# Patient Record
Sex: FEMALE | Race: WHITE | NOT HISPANIC OR LATINO | Employment: FULL TIME | URBAN - METROPOLITAN AREA
[De-identification: names, ages, dates, MRNs, and addresses within clinical notes are randomized per-mention and may not be internally consistent; named-entity substitution may affect disease eponyms.]

---

## 2020-11-10 DIAGNOSIS — Z11.59 ENCOUNTER FOR SCREENING FOR OTHER VIRAL DISEASES: Primary | ICD-10-CM

## 2020-11-25 RX ORDER — ATORVASTATIN CALCIUM 40 MG/1
40 TABLET, FILM COATED ORAL DAILY
COMMUNITY

## 2020-11-25 RX ORDER — IBUPROFEN 600 MG/1
600 TABLET, FILM COATED ORAL EVERY 6 HOURS PRN
Status: ON HOLD | COMMUNITY
End: 2020-12-10

## 2020-11-25 RX ORDER — ASPIRIN 81 MG/1
81 TABLET ORAL DAILY
Status: ON HOLD | COMMUNITY
End: 2020-12-10

## 2020-11-25 RX ORDER — PHENOL 1.4 %
1 AEROSOL, SPRAY (ML) MUCOUS MEMBRANE 2 TIMES DAILY WITH MEALS
COMMUNITY

## 2020-11-25 RX ORDER — LEVOTHYROXINE SODIUM 50 UG/1
50 TABLET ORAL DAILY
COMMUNITY

## 2020-11-25 RX ORDER — ACETAMINOPHEN 500 MG
500-1000 TABLET ORAL EVERY 6 HOURS PRN
Status: ON HOLD | COMMUNITY
End: 2020-12-10

## 2020-11-27 DIAGNOSIS — Z11.59 ENCOUNTER FOR SCREENING FOR OTHER VIRAL DISEASES: ICD-10-CM

## 2020-11-27 PROCEDURE — U0003 INFECTIOUS AGENT DETECTION BY NUCLEIC ACID (DNA OR RNA); SEVERE ACUTE RESPIRATORY SYNDROME CORONAVIRUS 2 (SARS-COV-2) (CORONAVIRUS DISEASE [COVID-19]), AMPLIFIED PROBE TECHNIQUE, MAKING USE OF HIGH THROUGHPUT TECHNOLOGIES AS DESCRIBED BY CMS-2020-01-R: HCPCS | Performed by: ORTHOPAEDIC SURGERY

## 2020-11-27 ASSESSMENT — MIFFLIN-ST. JEOR: SCORE: 1782.56

## 2020-11-28 LAB
SARS-COV-2 RNA SPEC QL NAA+PROBE: NOT DETECTED
SPECIMEN SOURCE: NORMAL

## 2020-12-02 DIAGNOSIS — Z11.59 ENCOUNTER FOR SCREENING FOR OTHER VIRAL DISEASES: Primary | ICD-10-CM

## 2020-12-03 ENCOUNTER — ANESTHESIA EVENT (OUTPATIENT)
Dept: SURGERY | Facility: CLINIC | Age: 50
End: 2020-12-03
Payer: COMMERCIAL

## 2020-12-07 DIAGNOSIS — Z11.59 ENCOUNTER FOR SCREENING FOR OTHER VIRAL DISEASES: ICD-10-CM

## 2020-12-07 PROCEDURE — U0003 INFECTIOUS AGENT DETECTION BY NUCLEIC ACID (DNA OR RNA); SEVERE ACUTE RESPIRATORY SYNDROME CORONAVIRUS 2 (SARS-COV-2) (CORONAVIRUS DISEASE [COVID-19]), AMPLIFIED PROBE TECHNIQUE, MAKING USE OF HIGH THROUGHPUT TECHNOLOGIES AS DESCRIBED BY CMS-2020-01-R: HCPCS | Performed by: ORTHOPAEDIC SURGERY

## 2020-12-08 LAB
LABORATORY COMMENT REPORT: NORMAL
SARS-COV-2 RNA SPEC QL NAA+PROBE: NEGATIVE
SARS-COV-2 RNA SPEC QL NAA+PROBE: NORMAL
SPECIMEN SOURCE: NORMAL
SPECIMEN SOURCE: NORMAL

## 2020-12-09 ENCOUNTER — APPOINTMENT (OUTPATIENT)
Dept: GENERAL RADIOLOGY | Facility: CLINIC | Age: 50
End: 2020-12-09
Attending: ORTHOPAEDIC SURGERY
Payer: COMMERCIAL

## 2020-12-09 ENCOUNTER — HOSPITAL ENCOUNTER (INPATIENT)
Facility: CLINIC | Age: 50
LOS: 1 days | Discharge: HOME | End: 2020-12-10
Attending: ORTHOPAEDIC SURGERY | Admitting: ORTHOPAEDIC SURGERY
Payer: COMMERCIAL

## 2020-12-09 ENCOUNTER — ANESTHESIA (OUTPATIENT)
Dept: SURGERY | Facility: CLINIC | Age: 50
End: 2020-12-09
Payer: COMMERCIAL

## 2020-12-09 DIAGNOSIS — Z98.1 S/P CERVICAL SPINAL FUSION: Primary | ICD-10-CM

## 2020-12-09 LAB
ABO + RH BLD: NORMAL
ABO + RH BLD: NORMAL
BLD GP AB SCN SERPL QL: NORMAL
BLOOD BANK CMNT PATIENT-IMP: NORMAL
GLUCOSE BLDC GLUCOMTR-MCNC: 179 MG/DL (ref 70–99)
GLUCOSE BLDC GLUCOMTR-MCNC: 188 MG/DL (ref 70–99)
GLUCOSE BLDC GLUCOMTR-MCNC: 85 MG/DL (ref 70–99)
SPECIMEN EXP DATE BLD: NORMAL

## 2020-12-09 PROCEDURE — 250N000009 HC RX 250: Performed by: NURSE ANESTHETIST, CERTIFIED REGISTERED

## 2020-12-09 PROCEDURE — 00NW0ZZ RELEASE CERVICAL SPINAL CORD, OPEN APPROACH: ICD-10-PCS | Performed by: ORTHOPAEDIC SURGERY

## 2020-12-09 PROCEDURE — 250N000013 HC RX MED GY IP 250 OP 250 PS 637: Performed by: PHYSICIAN ASSISTANT

## 2020-12-09 PROCEDURE — 99222 1ST HOSP IP/OBS MODERATE 55: CPT | Performed by: INTERNAL MEDICINE

## 2020-12-09 PROCEDURE — 999N000179 XR SURGERY CARM FLUORO LESS THAN 5 MIN W STILLS: Mod: TC

## 2020-12-09 PROCEDURE — 86850 RBC ANTIBODY SCREEN: CPT | Performed by: ANESTHESIOLOGY

## 2020-12-09 PROCEDURE — 250N000011 HC RX IP 250 OP 636: Performed by: NURSE ANESTHETIST, CERTIFIED REGISTERED

## 2020-12-09 PROCEDURE — C1762 CONN TISS, HUMAN(INC FASCIA): HCPCS | Performed by: ORTHOPAEDIC SURGERY

## 2020-12-09 PROCEDURE — 999N001017 HC STATISTIC GLUCOSE BY METER IP

## 2020-12-09 PROCEDURE — C1713 ANCHOR/SCREW BN/BN,TIS/BN: HCPCS | Performed by: ORTHOPAEDIC SURGERY

## 2020-12-09 PROCEDURE — 0RG20A0 FUSION OF 2 OR MORE CERVICAL VERTEBRAL JOINTS WITH INTERBODY FUSION DEVICE, ANTERIOR APPROACH, ANTERIOR COLUMN, OPEN APPROACH: ICD-10-PCS | Performed by: ORTHOPAEDIC SURGERY

## 2020-12-09 PROCEDURE — 0RT30ZZ RESECTION OF CERVICAL VERTEBRAL DISC, OPEN APPROACH: ICD-10-PCS | Performed by: ORTHOPAEDIC SURGERY

## 2020-12-09 PROCEDURE — 761N000001 HC RECOVERY PHASE 1 LEVEL 1 FIRST HR: Performed by: ORTHOPAEDIC SURGERY

## 2020-12-09 PROCEDURE — 258N000003 HC RX IP 258 OP 636: Performed by: NURSE ANESTHETIST, CERTIFIED REGISTERED

## 2020-12-09 PROCEDURE — 250N000011 HC RX IP 250 OP 636: Performed by: ANESTHESIOLOGY

## 2020-12-09 PROCEDURE — 370N000001 HC ANESTHESIA TECHNICAL FEE, 1ST 30 MIN: Performed by: ORTHOPAEDIC SURGERY

## 2020-12-09 PROCEDURE — 272N000001 HC OR GENERAL SUPPLY STERILE: Performed by: ORTHOPAEDIC SURGERY

## 2020-12-09 PROCEDURE — 370N000002 HC ANESTHESIA TECHNICAL FEE, EACH ADDTL 15 MIN: Performed by: ORTHOPAEDIC SURGERY

## 2020-12-09 PROCEDURE — 250N000013 HC RX MED GY IP 250 OP 250 PS 637: Performed by: ANESTHESIOLOGY

## 2020-12-09 PROCEDURE — 250N000013 HC RX MED GY IP 250 OP 250 PS 637: Performed by: INTERNAL MEDICINE

## 2020-12-09 PROCEDURE — 86900 BLOOD TYPING SEROLOGIC ABO: CPT | Performed by: ANESTHESIOLOGY

## 2020-12-09 PROCEDURE — 99207 PR CONSULT E&M CHANGED TO INITIAL LEVEL: CPT | Performed by: INTERNAL MEDICINE

## 2020-12-09 PROCEDURE — 86901 BLOOD TYPING SEROLOGIC RH(D): CPT | Performed by: ANESTHESIOLOGY

## 2020-12-09 PROCEDURE — 761N000002 HC RECOVERY PHASE 1 LEVEL 1 EA ADDTL HR: Performed by: ORTHOPAEDIC SURGERY

## 2020-12-09 PROCEDURE — 360N000036 HC SURGERY LEVEL 5 W FLUORO 1ST 30 MIN: Performed by: ORTHOPAEDIC SURGERY

## 2020-12-09 PROCEDURE — 250N000011 HC RX IP 250 OP 636: Performed by: PHYSICIAN ASSISTANT

## 2020-12-09 PROCEDURE — 250N000009 HC RX 250: Performed by: ORTHOPAEDIC SURGERY

## 2020-12-09 PROCEDURE — 120N000001 HC R&B MED SURG/OB

## 2020-12-09 PROCEDURE — 999N000136 HC STATISTIC PRE PROC ASSESS II: Performed by: ORTHOPAEDIC SURGERY

## 2020-12-09 PROCEDURE — 360N000033 HC SURGERY LEVEL 5 EA 15 ADDTL MIN: Performed by: ORTHOPAEDIC SURGERY

## 2020-12-09 PROCEDURE — 258N000003 HC RX IP 258 OP 636: Performed by: ANESTHESIOLOGY

## 2020-12-09 PROCEDURE — 258N000003 HC RX IP 258 OP 636: Performed by: PHYSICIAN ASSISTANT

## 2020-12-09 PROCEDURE — 8E09XBF COMPUTER ASSISTED PROCEDURE OF HEAD AND NECK REGION, WITH FLUOROSCOPY: ICD-10-PCS | Performed by: ORTHOPAEDIC SURGERY

## 2020-12-09 DEVICE — GRAFT BONE CRUSH CANC 15ML 400075: Type: IMPLANTABLE DEVICE | Site: SPINE CERVICAL | Status: FUNCTIONAL

## 2020-12-09 DEVICE — IMPLANTABLE DEVICE: Type: IMPLANTABLE DEVICE | Site: SPINE CERVICAL | Status: FUNCTIONAL

## 2020-12-09 RX ORDER — HYDRALAZINE HYDROCHLORIDE 20 MG/ML
2.5-5 INJECTION INTRAMUSCULAR; INTRAVENOUS EVERY 10 MIN PRN
Status: DISCONTINUED | OUTPATIENT
Start: 2020-12-09 | End: 2020-12-09 | Stop reason: HOSPADM

## 2020-12-09 RX ORDER — FENTANYL CITRATE-0.9 % NACL/PF 10 MCG/ML
PLASTIC BAG, INJECTION (ML) INTRAVENOUS CONTINUOUS PRN
Status: DISCONTINUED | OUTPATIENT
Start: 2020-12-09 | End: 2020-12-09

## 2020-12-09 RX ORDER — NALOXONE HYDROCHLORIDE 0.4 MG/ML
0.4 INJECTION, SOLUTION INTRAMUSCULAR; INTRAVENOUS; SUBCUTANEOUS
Status: DISCONTINUED | OUTPATIENT
Start: 2020-12-09 | End: 2020-12-10 | Stop reason: HOSPADM

## 2020-12-09 RX ORDER — ATORVASTATIN CALCIUM 40 MG/1
40 TABLET, FILM COATED ORAL DAILY
Status: DISCONTINUED | OUTPATIENT
Start: 2020-12-09 | End: 2020-12-10 | Stop reason: HOSPADM

## 2020-12-09 RX ORDER — NALOXONE HYDROCHLORIDE 0.4 MG/ML
0.2 INJECTION, SOLUTION INTRAMUSCULAR; INTRAVENOUS; SUBCUTANEOUS
Status: DISCONTINUED | OUTPATIENT
Start: 2020-12-09 | End: 2020-12-10 | Stop reason: HOSPADM

## 2020-12-09 RX ORDER — METHOCARBAMOL 750 MG/1
750 TABLET, FILM COATED ORAL 4 TIMES DAILY PRN
Status: DISCONTINUED | OUTPATIENT
Start: 2020-12-09 | End: 2020-12-10 | Stop reason: HOSPADM

## 2020-12-09 RX ORDER — LABETALOL 20 MG/4 ML (5 MG/ML) INTRAVENOUS SYRINGE
10
Status: DISCONTINUED | OUTPATIENT
Start: 2020-12-09 | End: 2020-12-09 | Stop reason: HOSPADM

## 2020-12-09 RX ORDER — ACETAMINOPHEN 325 MG/1
975 TABLET ORAL EVERY 8 HOURS
Status: DISCONTINUED | OUTPATIENT
Start: 2020-12-09 | End: 2020-12-10 | Stop reason: HOSPADM

## 2020-12-09 RX ORDER — EPHEDRINE SULFATE 50 MG/ML
INJECTION, SOLUTION INTRAMUSCULAR; INTRAVENOUS; SUBCUTANEOUS PRN
Status: DISCONTINUED | OUTPATIENT
Start: 2020-12-09 | End: 2020-12-09

## 2020-12-09 RX ORDER — ONDANSETRON 2 MG/ML
4 INJECTION INTRAMUSCULAR; INTRAVENOUS EVERY 30 MIN PRN
Status: DISCONTINUED | OUTPATIENT
Start: 2020-12-09 | End: 2020-12-09 | Stop reason: HOSPADM

## 2020-12-09 RX ORDER — NALOXONE HYDROCHLORIDE 0.4 MG/ML
0.2 INJECTION, SOLUTION INTRAMUSCULAR; INTRAVENOUS; SUBCUTANEOUS
Status: DISCONTINUED | OUTPATIENT
Start: 2020-12-09 | End: 2020-12-09 | Stop reason: HOSPADM

## 2020-12-09 RX ORDER — NEOSTIGMINE METHYLSULFATE 1 MG/ML
VIAL (ML) INJECTION PRN
Status: DISCONTINUED | OUTPATIENT
Start: 2020-12-09 | End: 2020-12-09

## 2020-12-09 RX ORDER — CELECOXIB 200 MG/1
200 CAPSULE ORAL ONCE
Status: COMPLETED | OUTPATIENT
Start: 2020-12-09 | End: 2020-12-09

## 2020-12-09 RX ORDER — ONDANSETRON 4 MG/1
4 TABLET, ORALLY DISINTEGRATING ORAL EVERY 30 MIN PRN
Status: DISCONTINUED | OUTPATIENT
Start: 2020-12-09 | End: 2020-12-09 | Stop reason: HOSPADM

## 2020-12-09 RX ORDER — ONDANSETRON 4 MG/1
4 TABLET, ORALLY DISINTEGRATING ORAL EVERY 6 HOURS PRN
Status: DISCONTINUED | OUTPATIENT
Start: 2020-12-09 | End: 2020-12-10 | Stop reason: HOSPADM

## 2020-12-09 RX ORDER — AMOXICILLIN 250 MG
1 CAPSULE ORAL 2 TIMES DAILY
Status: DISCONTINUED | OUTPATIENT
Start: 2020-12-09 | End: 2020-12-10 | Stop reason: HOSPADM

## 2020-12-09 RX ORDER — DEXTROSE MONOHYDRATE 25 G/50ML
25-50 INJECTION, SOLUTION INTRAVENOUS
Status: DISCONTINUED | OUTPATIENT
Start: 2020-12-09 | End: 2020-12-10 | Stop reason: HOSPADM

## 2020-12-09 RX ORDER — CEFAZOLIN SODIUM IN 0.9 % NACL 3 G/100 ML
3 INTRAVENOUS SOLUTION, PIGGYBACK (ML) INTRAVENOUS
Status: COMPLETED | OUTPATIENT
Start: 2020-12-09 | End: 2020-12-09

## 2020-12-09 RX ORDER — LIDOCAINE HYDROCHLORIDE 10 MG/ML
INJECTION, SOLUTION INFILTRATION; PERINEURAL PRN
Status: DISCONTINUED | OUTPATIENT
Start: 2020-12-09 | End: 2020-12-09

## 2020-12-09 RX ORDER — ALBUTEROL SULFATE 0.83 MG/ML
2.5 SOLUTION RESPIRATORY (INHALATION) EVERY 4 HOURS PRN
Status: DISCONTINUED | OUTPATIENT
Start: 2020-12-09 | End: 2020-12-09 | Stop reason: HOSPADM

## 2020-12-09 RX ORDER — LIDOCAINE 40 MG/G
CREAM TOPICAL
Status: DISCONTINUED | OUTPATIENT
Start: 2020-12-09 | End: 2020-12-09 | Stop reason: HOSPADM

## 2020-12-09 RX ORDER — HYDROMORPHONE HYDROCHLORIDE 1 MG/ML
.2-.4 INJECTION, SOLUTION INTRAMUSCULAR; INTRAVENOUS; SUBCUTANEOUS
Status: DISCONTINUED | OUTPATIENT
Start: 2020-12-09 | End: 2020-12-10 | Stop reason: HOSPADM

## 2020-12-09 RX ORDER — SODIUM CHLORIDE, SODIUM LACTATE, POTASSIUM CHLORIDE, CALCIUM CHLORIDE 600; 310; 30; 20 MG/100ML; MG/100ML; MG/100ML; MG/100ML
INJECTION, SOLUTION INTRAVENOUS CONTINUOUS
Status: DISCONTINUED | OUTPATIENT
Start: 2020-12-09 | End: 2020-12-09 | Stop reason: HOSPADM

## 2020-12-09 RX ORDER — NALOXONE HYDROCHLORIDE 0.4 MG/ML
0.4 INJECTION, SOLUTION INTRAMUSCULAR; INTRAVENOUS; SUBCUTANEOUS
Status: DISCONTINUED | OUTPATIENT
Start: 2020-12-09 | End: 2020-12-09 | Stop reason: HOSPADM

## 2020-12-09 RX ORDER — PROPOFOL 10 MG/ML
INJECTION, EMULSION INTRAVENOUS PRN
Status: DISCONTINUED | OUTPATIENT
Start: 2020-12-09 | End: 2020-12-09

## 2020-12-09 RX ORDER — FENTANYL CITRATE 50 UG/ML
25-50 INJECTION, SOLUTION INTRAMUSCULAR; INTRAVENOUS
Status: DISCONTINUED | OUTPATIENT
Start: 2020-12-09 | End: 2020-12-09 | Stop reason: HOSPADM

## 2020-12-09 RX ORDER — KETAMINE HYDROCHLORIDE 10 MG/ML
INJECTION INTRAMUSCULAR; INTRAVENOUS PRN
Status: DISCONTINUED | OUTPATIENT
Start: 2020-12-09 | End: 2020-12-09

## 2020-12-09 RX ORDER — OXYCODONE HYDROCHLORIDE 5 MG/1
5-10 TABLET ORAL
Status: DISCONTINUED | OUTPATIENT
Start: 2020-12-09 | End: 2020-12-10 | Stop reason: HOSPADM

## 2020-12-09 RX ORDER — CEFAZOLIN SODIUM 1 G/3ML
1 INJECTION, POWDER, FOR SOLUTION INTRAMUSCULAR; INTRAVENOUS SEE ADMIN INSTRUCTIONS
Status: DISCONTINUED | OUTPATIENT
Start: 2020-12-09 | End: 2020-12-09 | Stop reason: HOSPADM

## 2020-12-09 RX ORDER — ONDANSETRON 2 MG/ML
4 INJECTION INTRAMUSCULAR; INTRAVENOUS EVERY 6 HOURS PRN
Status: DISCONTINUED | OUTPATIENT
Start: 2020-12-09 | End: 2020-12-10 | Stop reason: HOSPADM

## 2020-12-09 RX ORDER — GABAPENTIN 300 MG/1
300 CAPSULE ORAL 2 TIMES DAILY
Status: DISCONTINUED | OUTPATIENT
Start: 2020-12-09 | End: 2020-12-10 | Stop reason: HOSPADM

## 2020-12-09 RX ORDER — SODIUM CHLORIDE 9 MG/ML
INJECTION, SOLUTION INTRAVENOUS CONTINUOUS
Status: DISCONTINUED | OUTPATIENT
Start: 2020-12-09 | End: 2020-12-10 | Stop reason: HOSPADM

## 2020-12-09 RX ORDER — MEPERIDINE HYDROCHLORIDE 25 MG/ML
12.5 INJECTION INTRAMUSCULAR; INTRAVENOUS; SUBCUTANEOUS
Status: DISCONTINUED | OUTPATIENT
Start: 2020-12-09 | End: 2020-12-09 | Stop reason: HOSPADM

## 2020-12-09 RX ORDER — NICOTINE POLACRILEX 4 MG
15-30 LOZENGE BUCCAL
Status: DISCONTINUED | OUTPATIENT
Start: 2020-12-09 | End: 2020-12-10 | Stop reason: HOSPADM

## 2020-12-09 RX ORDER — LEVOTHYROXINE SODIUM 50 UG/1
50 TABLET ORAL DAILY
Status: DISCONTINUED | OUTPATIENT
Start: 2020-12-10 | End: 2020-12-10 | Stop reason: HOSPADM

## 2020-12-09 RX ORDER — BUPIVACAINE HYDROCHLORIDE AND EPINEPHRINE 2.5; 5 MG/ML; UG/ML
30 INJECTION, SOLUTION EPIDURAL; INFILTRATION; INTRACAUDAL; PERINEURAL ONCE
Status: COMPLETED | OUTPATIENT
Start: 2020-12-09 | End: 2020-12-09

## 2020-12-09 RX ORDER — AMOXICILLIN 250 MG
2 CAPSULE ORAL 2 TIMES DAILY
Status: DISCONTINUED | OUTPATIENT
Start: 2020-12-09 | End: 2020-12-10 | Stop reason: HOSPADM

## 2020-12-09 RX ORDER — HYDROXYZINE HYDROCHLORIDE 25 MG/1
25 TABLET, FILM COATED ORAL EVERY 6 HOURS PRN
Status: DISCONTINUED | OUTPATIENT
Start: 2020-12-09 | End: 2020-12-10 | Stop reason: HOSPADM

## 2020-12-09 RX ORDER — LIDOCAINE 40 MG/G
CREAM TOPICAL
Status: DISCONTINUED | OUTPATIENT
Start: 2020-12-09 | End: 2020-12-10 | Stop reason: HOSPADM

## 2020-12-09 RX ORDER — FENTANYL CITRATE 50 UG/ML
INJECTION, SOLUTION INTRAMUSCULAR; INTRAVENOUS PRN
Status: DISCONTINUED | OUTPATIENT
Start: 2020-12-09 | End: 2020-12-09

## 2020-12-09 RX ORDER — HYDROMORPHONE HYDROCHLORIDE 1 MG/ML
.3-.5 INJECTION, SOLUTION INTRAMUSCULAR; INTRAVENOUS; SUBCUTANEOUS EVERY 10 MIN PRN
Status: DISCONTINUED | OUTPATIENT
Start: 2020-12-09 | End: 2020-12-09 | Stop reason: HOSPADM

## 2020-12-09 RX ORDER — GLYCOPYRROLATE 0.2 MG/ML
INJECTION, SOLUTION INTRAMUSCULAR; INTRAVENOUS PRN
Status: DISCONTINUED | OUTPATIENT
Start: 2020-12-09 | End: 2020-12-09

## 2020-12-09 RX ORDER — ACETAMINOPHEN 325 MG/1
975 TABLET ORAL ONCE
Status: COMPLETED | OUTPATIENT
Start: 2020-12-09 | End: 2020-12-09

## 2020-12-09 RX ORDER — CALCIUM CARBONATE 500(1250)
1 TABLET ORAL 2 TIMES DAILY WITH MEALS
Status: DISCONTINUED | OUTPATIENT
Start: 2020-12-09 | End: 2020-12-10 | Stop reason: HOSPADM

## 2020-12-09 RX ORDER — CEFAZOLIN SODIUM 1 G/3ML
1 INJECTION, POWDER, FOR SOLUTION INTRAMUSCULAR; INTRAVENOUS EVERY 8 HOURS
Status: COMPLETED | OUTPATIENT
Start: 2020-12-10 | End: 2020-12-10

## 2020-12-09 RX ORDER — DEXAMETHASONE SODIUM PHOSPHATE 4 MG/ML
INJECTION, SOLUTION INTRA-ARTICULAR; INTRALESIONAL; INTRAMUSCULAR; INTRAVENOUS; SOFT TISSUE PRN
Status: DISCONTINUED | OUTPATIENT
Start: 2020-12-09 | End: 2020-12-09

## 2020-12-09 RX ORDER — PROCHLORPERAZINE MALEATE 5 MG
10 TABLET ORAL EVERY 6 HOURS PRN
Status: DISCONTINUED | OUTPATIENT
Start: 2020-12-09 | End: 2020-12-10 | Stop reason: HOSPADM

## 2020-12-09 RX ORDER — ACETAMINOPHEN 325 MG/1
650 TABLET ORAL EVERY 4 HOURS PRN
Status: DISCONTINUED | OUTPATIENT
Start: 2020-12-12 | End: 2020-12-10 | Stop reason: HOSPADM

## 2020-12-09 RX ADMIN — ROCURONIUM BROMIDE 10 MG: 10 INJECTION INTRAVENOUS at 13:55

## 2020-12-09 RX ADMIN — SODIUM CHLORIDE, POTASSIUM CHLORIDE, SODIUM LACTATE AND CALCIUM CHLORIDE: 600; 310; 30; 20 INJECTION, SOLUTION INTRAVENOUS at 17:14

## 2020-12-09 RX ADMIN — Medication 1 G: at 16:30

## 2020-12-09 RX ADMIN — HYDROXYZINE HYDROCHLORIDE 25 MG: 25 TABLET, FILM COATED ORAL at 22:36

## 2020-12-09 RX ADMIN — PHENYLEPHRINE HYDROCHLORIDE 100 MCG: 10 INJECTION INTRAVENOUS at 13:08

## 2020-12-09 RX ADMIN — ROCURONIUM BROMIDE 50 MG: 10 INJECTION INTRAVENOUS at 12:26

## 2020-12-09 RX ADMIN — Medication 5 MG: at 13:08

## 2020-12-09 RX ADMIN — ROCURONIUM BROMIDE 10 MG: 10 INJECTION INTRAVENOUS at 15:52

## 2020-12-09 RX ADMIN — HYDROMORPHONE HYDROCHLORIDE 0.5 MG: 1 INJECTION, SOLUTION INTRAMUSCULAR; INTRAVENOUS; SUBCUTANEOUS at 18:00

## 2020-12-09 RX ADMIN — FENTANYL CITRATE 150 MCG: 50 INJECTION, SOLUTION INTRAMUSCULAR; INTRAVENOUS at 12:26

## 2020-12-09 RX ADMIN — GLYCOPYRROLATE 0.7 MG: 0.2 INJECTION, SOLUTION INTRAMUSCULAR; INTRAVENOUS at 17:17

## 2020-12-09 RX ADMIN — PROPOFOL 160 MG: 10 INJECTION, EMULSION INTRAVENOUS at 12:26

## 2020-12-09 RX ADMIN — SODIUM CHLORIDE, POTASSIUM CHLORIDE, SODIUM LACTATE AND CALCIUM CHLORIDE: 600; 310; 30; 20 INJECTION, SOLUTION INTRAVENOUS at 12:18

## 2020-12-09 RX ADMIN — SODIUM CHLORIDE: 9 INJECTION, SOLUTION INTRAVENOUS at 20:48

## 2020-12-09 RX ADMIN — Medication 3 G: at 12:32

## 2020-12-09 RX ADMIN — ROCURONIUM BROMIDE 10 MG: 10 INJECTION INTRAVENOUS at 13:17

## 2020-12-09 RX ADMIN — SODIUM CHLORIDE, POTASSIUM CHLORIDE, SODIUM LACTATE AND CALCIUM CHLORIDE: 600; 310; 30; 20 INJECTION, SOLUTION INTRAVENOUS at 13:12

## 2020-12-09 RX ADMIN — VASOPRESSIN 0.5 UNITS: 20 INJECTION INTRAVENOUS at 12:46

## 2020-12-09 RX ADMIN — Medication 5 MG: at 12:56

## 2020-12-09 RX ADMIN — DEXMEDETOMIDINE HYDROCHLORIDE 0.4 MCG/KG/HR: 100 INJECTION, SOLUTION INTRAVENOUS at 12:33

## 2020-12-09 RX ADMIN — SODIUM CHLORIDE, POTASSIUM CHLORIDE, SODIUM LACTATE AND CALCIUM CHLORIDE: 600; 310; 30; 20 INJECTION, SOLUTION INTRAVENOUS at 14:26

## 2020-12-09 RX ADMIN — Medication 5 MG: at 17:17

## 2020-12-09 RX ADMIN — ROCURONIUM BROMIDE 20 MG: 10 INJECTION INTRAVENOUS at 14:49

## 2020-12-09 RX ADMIN — MIDAZOLAM 2 MG: 1 INJECTION INTRAMUSCULAR; INTRAVENOUS at 12:20

## 2020-12-09 RX ADMIN — PHENYLEPHRINE HYDROCHLORIDE 100 MCG: 10 INJECTION INTRAVENOUS at 12:56

## 2020-12-09 RX ADMIN — PHENYLEPHRINE HYDROCHLORIDE 200 MCG: 10 INJECTION INTRAVENOUS at 12:36

## 2020-12-09 RX ADMIN — PHENYLEPHRINE HYDROCHLORIDE 50 MCG: 10 INJECTION INTRAVENOUS at 13:50

## 2020-12-09 RX ADMIN — ROCURONIUM BROMIDE 10 MG: 10 INJECTION INTRAVENOUS at 15:24

## 2020-12-09 RX ADMIN — PHENYLEPHRINE HYDROCHLORIDE 200 MCG: 10 INJECTION INTRAVENOUS at 12:32

## 2020-12-09 RX ADMIN — ATORVASTATIN CALCIUM 40 MG: 40 TABLET, FILM COATED ORAL at 22:35

## 2020-12-09 RX ADMIN — FENTANYL CITRATE 50 MCG: 50 INJECTION, SOLUTION INTRAMUSCULAR; INTRAVENOUS at 14:05

## 2020-12-09 RX ADMIN — ONDANSETRON 4 MG: 2 INJECTION INTRAMUSCULAR; INTRAVENOUS at 21:41

## 2020-12-09 RX ADMIN — Medication 10 MG: at 12:36

## 2020-12-09 RX ADMIN — DOCUSATE SODIUM AND SENNOSIDES 1 TABLET: 8.6; 5 TABLET ORAL at 19:53

## 2020-12-09 RX ADMIN — HYDROMORPHONE HYDROCHLORIDE 0.5 MG: 1 INJECTION, SOLUTION INTRAMUSCULAR; INTRAVENOUS; SUBCUTANEOUS at 16:16

## 2020-12-09 RX ADMIN — PHENYLEPHRINE HYDROCHLORIDE 50 MCG: 10 INJECTION INTRAVENOUS at 13:32

## 2020-12-09 RX ADMIN — HYDROMORPHONE HYDROCHLORIDE 1 MG: 1 INJECTION, SOLUTION INTRAMUSCULAR; INTRAVENOUS; SUBCUTANEOUS at 14:11

## 2020-12-09 RX ADMIN — GABAPENTIN 300 MG: 300 CAPSULE ORAL at 19:53

## 2020-12-09 RX ADMIN — ACETAMINOPHEN 975 MG: 325 TABLET, FILM COATED ORAL at 19:52

## 2020-12-09 RX ADMIN — ROCURONIUM BROMIDE 10 MG: 10 INJECTION INTRAVENOUS at 16:19

## 2020-12-09 RX ADMIN — Medication 1 G: at 14:32

## 2020-12-09 RX ADMIN — PHENYLEPHRINE HYDROCHLORIDE 100 MCG: 10 INJECTION INTRAVENOUS at 13:17

## 2020-12-09 RX ADMIN — FENTANYL CITRATE 50 MCG: 50 INJECTION, SOLUTION INTRAMUSCULAR; INTRAVENOUS at 13:00

## 2020-12-09 RX ADMIN — PHENYLEPHRINE HYDROCHLORIDE 100 MCG: 10 INJECTION INTRAVENOUS at 12:54

## 2020-12-09 RX ADMIN — ACETAMINOPHEN 975 MG: 325 TABLET, FILM COATED ORAL at 10:52

## 2020-12-09 RX ADMIN — Medication 50 MG: at 12:34

## 2020-12-09 RX ADMIN — CELECOXIB 200 MG: 200 CAPSULE ORAL at 10:52

## 2020-12-09 RX ADMIN — Medication 50 MCG/MIN: at 13:54

## 2020-12-09 RX ADMIN — ROCURONIUM BROMIDE 10 MG: 10 INJECTION INTRAVENOUS at 14:26

## 2020-12-09 RX ADMIN — Medication 5 MG: at 12:54

## 2020-12-09 RX ADMIN — OXYCODONE HYDROCHLORIDE 5 MG: 5 TABLET ORAL at 19:53

## 2020-12-09 RX ADMIN — LIDOCAINE HYDROCHLORIDE 50 MG: 10 INJECTION, SOLUTION INFILTRATION; PERINEURAL at 12:26

## 2020-12-09 RX ADMIN — DEXAMETHASONE SODIUM PHOSPHATE 4 MG: 4 INJECTION, SOLUTION INTRA-ARTICULAR; INTRALESIONAL; INTRAMUSCULAR; INTRAVENOUS; SOFT TISSUE at 12:26

## 2020-12-09 RX ADMIN — HYDROMORPHONE HYDROCHLORIDE 0.5 MG: 1 INJECTION, SOLUTION INTRAMUSCULAR; INTRAVENOUS; SUBCUTANEOUS at 16:27

## 2020-12-09 ASSESSMENT — ACTIVITIES OF DAILY LIVING (ADL): ADLS_ACUITY_SCORE: 14

## 2020-12-09 ASSESSMENT — COPD QUESTIONNAIRES: COPD: 0

## 2020-12-09 ASSESSMENT — MIFFLIN-ST. JEOR: SCORE: 1778.02

## 2020-12-09 NOTE — ANESTHESIA PREPROCEDURE EVALUATION
Anesthesia Pre-Procedure Evaluation    Patient: Mary Willard   MRN: [id number] : 1970          Preoperative Diagnosis: Myelopathy (H) [G95.9]  Spinal stenosis in cervical region [M48.02]  Radiculopathy, cervical [M54.12]  Spinal osteoarthritis [M47.9]    Procedure(s):  Cervical 4-6 anterior cervical discectomy and fusion    Past Medical History:   Diagnosis Date     Diabetes (H)     type 1     Sleep apnea     uses cpap     Thyroid disease     hypothyroidism     Past Surgical History:   Procedure Laterality Date      SECTION      x2     CHOLECYSTECTOMY       GYN SURGERY      tubal ligation     ORTHOPEDIC SURGERY Bilateral     carpal tunnel repair     ORTHOPEDIC SURGERY      trigger finger release x2     Anesthesia Evaluation     . Pt has had prior anesthetic. Type: General    No history of anesthetic complications          ROS/MED HX    ENT/Pulmonary:     (+)sleep apnea, , . .   (-) asthma and COPD   Neurologic:     (+)neuropathy     Cardiovascular:        (-) hypertension, taking anticoagulants/antiplatelets and syncope   METS/Exercise Tolerance:     Hematologic:  - neg hematologic  ROS       Musculoskeletal:   (+) arthritis,  -       GI/Hepatic:  - neg GI/hepatic ROS      (-) GERD   Renal/Genitourinary:  - ROS Renal section negative       Endo:     (+) type I DM, Using insulin thyroid problem hypothyroidism, Obesity ( 51), .      Psychiatric:  - neg psychiatric ROS       Infectious Disease:  - neg infectious disease ROS       Malignancy:      - no malignancy   Other:                          Physical Exam  Normal systems: cardiovascular, pulmonary and dental    Airway   Mallampati: III  TM distance: >3 FB  Neck ROM: full    Dental     Cardiovascular       Pulmonary             No results found for: WBC, HGB, HCT, PLT, CRP, SED, NA, POTASSIUM, CHLORIDE, CO2, BUN, CR, GLC, ALEJANDRO, PHOS, MAG, ALBUMIN, PROTTOTAL, ALT, AST, GGT, ALKPHOS, BILITOTAL, BILIDIRECT, LIPASE, AMYLASE, COY, PTT, INR, FIBR,  "TSH, T4, T3, HCG, HCGS, CKTOTAL, CKMB, TROPN    Preop Vitals  BP Readings from Last 3 Encounters:   12/09/20 (!) 179/79    Pulse Readings from Last 3 Encounters:   12/09/20 86      Resp Readings from Last 3 Encounters:   12/09/20 20    SpO2 Readings from Last 3 Encounters:   12/09/20 98%      Temp Readings from Last 1 Encounters:   12/09/20 97.5  F (36.4  C) (Temporal)    Ht Readings from Last 1 Encounters:   12/09/20 1.549 m (5' 1\")      Wt Readings from Last 1 Encounters:   12/09/20 121.6 kg (268 lb)    Estimated body mass index is 50.64 kg/m  as calculated from the following:    Height as of this encounter: 1.549 m (5' 1\").    Weight as of this encounter: 121.6 kg (268 lb).       Anesthesia Plan      History & Physical Review  History and physical reviewed and following examination; no interval change.    ASA Status:  3 .    NPO Status:  > 8 hours    Plan for General with Intravenous induction. Maintenance will be Balanced.    PONV prophylaxis:  Ondansetron (or other 5HT-3) and Dexamethasone or Solumedrol         Postoperative Care  Postoperative pain management:  IV analgesics, Oral pain medications and Multi-modal analgesia.      Consents  Anesthetic plan, risks, benefits and alternatives discussed with:  Patient..                 Franco Finley MD                    .  "

## 2020-12-09 NOTE — ANESTHESIA PROCEDURE NOTES
Airway   Date/Time: 12/9/2020 12:28 PM   Patient location during procedure: OR    Staff -   Anesthesiologist:  Franco Finley MD  CRNA: Josie Colón APRN CRNA  Performed By: CRNA    Consent for Airway   Urgency: elective    Indications and Patient Condition  Indications for airway management: staci-procedural  Induction type:intravenousMask difficulty assessment: 2 - vent by mask + OA or adjuvant +/- NMBA    Final Airway Details  Final airway type: endotracheal airway  Successful airway:ETT - single  Endotracheal Airway Details   ETT size (mm): 8.0  Cuffed: yes  Cuff volume (mL): 7  Successful intubation technique: video laryngoscopy  Grade View of Cords: 1  Adjucts: stylet  Measured from: gums/teeth  Secured at (cm): 22  Secured with: plastic tape  Bite block used: Oral Airway    Post intubation assessment   Placement verified by: capnometry, equal breath sounds and chest rise   Number of attempts at approach: 1  Secured with:plastic tape  Ease of procedure: easy  Dentition: Intact and Unchanged

## 2020-12-09 NOTE — PROGRESS NOTES
"Ortho Rounding Note    S:  voices no major medical concerns  Waking in pacu    O:  Vital signs:   Blood pressure (!) 153/90, pulse 76, temperature 97.5  F (36.4  C), temperature source Temporal, resp. rate 16, height 1.549 m (5' 1\"), weight 121.6 kg (268 lb), SpO2 99 %.  Estimated body mass index is 50.64 kg/m  as calculated from the following:    Height as of this encounter: 1.549 m (5' 1\").    Weight as of this encounter: 121.6 kg (268 lb).      Intake/Output Summary (Last 24 hours) at 12/9/2020 1751  Last data filed at 12/9/2020 1743  Gross per 24 hour   Intake 3300 ml   Output 1400 ml   Net 1900 ml         Drain intact  Dressings c/d/i  5/5 motor and SPLT in BL UE    A:  POD # 0 s/p C45 and C56 ACDF  Cervical Myelopathy d/t multilevel cervical stenosis  Morbid Obesity  DM 1    P:  General: plan for MRI tomorrow to assess decompression  Pain: PO/IV  Act: up ad cheryl, with therapy and nursing only,  DVT: Mech only  ID: routine postop abx to be completed 24 hours after surgery  Dispo: Consider discharge on Thursday if doing well (4.5 hour surgery)    Appreciate Consult Services assistance    John Silver MD        "

## 2020-12-09 NOTE — OR NURSING
This RN spoke with MD Silver regarding patient allergy to PCN - Adrianne states ok to give test dose and monitor closely.

## 2020-12-09 NOTE — ANESTHESIA CARE TRANSFER NOTE
Patient: Mary Willard    Procedure(s):  Cervical 4-6 anterior cervical discectomy and fusion    Diagnosis: Myelopathy (H) [G95.9]  Spinal stenosis in cervical region [M48.02]  Radiculopathy, cervical [M54.12]  Spinal osteoarthritis [M47.9]  Diagnosis Additional Information: No value filed.    Anesthesia Type:   General     Note:  Airway :Face Mask  Patient transferred to:PACU  Handoff Report: Identifed the Patient, Identified the Reponsible Provider, Reviewed the pertinent medical history, Discussed the surgical course, Reviewed Intra-OP anesthesia mangement and issues during anesthesia, Set expectations for post-procedure period and Allowed opportunity for questions and acknowledgement of understanding      Vitals: (Last set prior to Anesthesia Care Transfer)    CRNA VITALS  12/9/2020 1704 - 12/9/2020 1744      12/9/2020             NIBP:  (!) 144/98    Pulse:  91    NIBP Mean:  116    SpO2:  95 %    Resp Rate (observed):  13                Electronically Signed By: JEAN Lo CRNA  December 9, 2020  5:44 PM

## 2020-12-09 NOTE — BRIEF OP NOTE
[facility]    Brief Operative Note    Pre-operative diagnosis:   Myelopathy (H) [G95.9]  Spinal stenosis in cervical region [M48.02]  Radiculopathy, cervical [M54.12]  Severe Spinal osteoarthritis [M47.9]  Morbid Obesity BMI>50  Cord Myelomalacia    Post-operative diagnosis Same as pre-operative diagnosis    Procedure: Procedure(s):  Cervical 4-6 anterior cervical discectomy and fusion  Surgeon: Surgeon(s) and Role:     * John Silver MD - Primary     * Tray Santiago PA-C - Assisting  Anesthesia: General   Estimated blood loss: Less than 50 ml  Drains: Eze-Nichols  Specimens: * No specimens in log *  Findings:   None.  Complications: None.  Implants:   Implant Name Type Inv. Item Serial No.  Lot No. LRB No. Used Action   GRAFT BONE CRUSH CANC [id number] Bone/Tissue/Biologic GRAFT BONE CRUSH CANC 15ML [id number] MUSCULOSKELETAL ESPINAL  N/A 1 Implanted   12 x 14 x 8mm DONNA Cage    SELMA [id number] N/A 2 Implanted   4.2 x 13mm Screws    SELMA  N/A 6 Implanted   42mm PLATE    SELMA  N/A 1 Implanted         John Silver MD

## 2020-12-10 ENCOUNTER — APPOINTMENT (OUTPATIENT)
Dept: MRI IMAGING | Facility: CLINIC | Age: 50
End: 2020-12-10
Attending: ORTHOPAEDIC SURGERY
Payer: COMMERCIAL

## 2020-12-10 ENCOUNTER — APPOINTMENT (OUTPATIENT)
Dept: PHYSICAL THERAPY | Facility: CLINIC | Age: 50
End: 2020-12-10
Attending: ORTHOPAEDIC SURGERY
Payer: COMMERCIAL

## 2020-12-10 VITALS
BODY MASS INDEX: 50.6 KG/M2 | RESPIRATION RATE: 16 BRPM | TEMPERATURE: 98 F | DIASTOLIC BLOOD PRESSURE: 73 MMHG | HEIGHT: 61 IN | OXYGEN SATURATION: 99 % | SYSTOLIC BLOOD PRESSURE: 184 MMHG | WEIGHT: 268 LBS | HEART RATE: 69 BPM

## 2020-12-10 LAB
ANION GAP SERPL CALCULATED.3IONS-SCNC: <1 MMOL/L (ref 3–14)
BUN SERPL-MCNC: 9 MG/DL (ref 7–30)
CALCIUM SERPL-MCNC: 8.8 MG/DL (ref 8.5–10.1)
CHLORIDE SERPL-SCNC: 109 MMOL/L (ref 94–109)
CO2 SERPL-SCNC: 31 MMOL/L (ref 20–32)
CREAT SERPL-MCNC: 0.57 MG/DL (ref 0.52–1.04)
GFR SERPL CREATININE-BSD FRML MDRD: >90 ML/MIN/{1.73_M2}
GLUCOSE BLDC GLUCOMTR-MCNC: 113 MG/DL (ref 70–99)
GLUCOSE BLDC GLUCOMTR-MCNC: 245 MG/DL (ref 70–99)
GLUCOSE SERPL-MCNC: 117 MG/DL (ref 70–99)
HBA1C MFR BLD: 6.5 % (ref 0–5.6)
POTASSIUM SERPL-SCNC: 4 MMOL/L (ref 3.4–5.3)
SODIUM SERPL-SCNC: 140 MMOL/L (ref 133–144)

## 2020-12-10 PROCEDURE — 97530 THERAPEUTIC ACTIVITIES: CPT | Mod: GP

## 2020-12-10 PROCEDURE — 97161 PT EVAL LOW COMPLEX 20 MIN: CPT | Mod: GP

## 2020-12-10 PROCEDURE — 72141 MRI NECK SPINE W/O DYE: CPT

## 2020-12-10 PROCEDURE — 83036 HEMOGLOBIN GLYCOSYLATED A1C: CPT | Performed by: INTERNAL MEDICINE

## 2020-12-10 PROCEDURE — 80048 BASIC METABOLIC PNL TOTAL CA: CPT | Performed by: INTERNAL MEDICINE

## 2020-12-10 PROCEDURE — 99232 SBSQ HOSP IP/OBS MODERATE 35: CPT | Performed by: INTERNAL MEDICINE

## 2020-12-10 PROCEDURE — 250N000013 HC RX MED GY IP 250 OP 250 PS 637: Performed by: INTERNAL MEDICINE

## 2020-12-10 PROCEDURE — 999N001017 HC STATISTIC GLUCOSE BY METER IP

## 2020-12-10 PROCEDURE — 250N000013 HC RX MED GY IP 250 OP 250 PS 637: Performed by: PHYSICIAN ASSISTANT

## 2020-12-10 PROCEDURE — 36415 COLL VENOUS BLD VENIPUNCTURE: CPT | Performed by: INTERNAL MEDICINE

## 2020-12-10 PROCEDURE — 250N000011 HC RX IP 250 OP 636: Performed by: PHYSICIAN ASSISTANT

## 2020-12-10 RX ORDER — OXYCODONE AND ACETAMINOPHEN 5; 325 MG/1; MG/1
1-2 TABLET ORAL EVERY 6 HOURS PRN
Qty: 20 TABLET | Refills: 0 | Status: SHIPPED | OUTPATIENT
Start: 2020-12-10 | End: 2020-12-13

## 2020-12-10 RX ORDER — METHOCARBAMOL 750 MG/1
750 TABLET, FILM COATED ORAL 4 TIMES DAILY PRN
Qty: 20 TABLET | Refills: 0 | Status: SHIPPED | OUTPATIENT
Start: 2020-12-10

## 2020-12-10 RX ORDER — AMOXICILLIN 250 MG
1 CAPSULE ORAL 2 TIMES DAILY
Qty: 20 TABLET | Refills: 0 | Status: SHIPPED | OUTPATIENT
Start: 2020-12-10

## 2020-12-10 RX ORDER — HYDROXYZINE HYDROCHLORIDE 25 MG/1
25 TABLET, FILM COATED ORAL EVERY 6 HOURS PRN
Qty: 15 TABLET | Refills: 0 | Status: SHIPPED | OUTPATIENT
Start: 2020-12-10

## 2020-12-10 RX ORDER — ONDANSETRON 4 MG/1
4 TABLET, ORALLY DISINTEGRATING ORAL EVERY 6 HOURS PRN
Qty: 15 TABLET | Refills: 0 | Status: SHIPPED | OUTPATIENT
Start: 2020-12-10

## 2020-12-10 RX ADMIN — CEFAZOLIN 1 G: 1 INJECTION, POWDER, FOR SOLUTION INTRAMUSCULAR; INTRAVENOUS at 07:54

## 2020-12-10 RX ADMIN — CALCIUM 500 MG: 500 TABLET ORAL at 07:53

## 2020-12-10 RX ADMIN — ACETAMINOPHEN 975 MG: 325 TABLET, FILM COATED ORAL at 11:34

## 2020-12-10 RX ADMIN — OXYCODONE HYDROCHLORIDE 10 MG: 5 TABLET ORAL at 00:09

## 2020-12-10 RX ADMIN — Medication 125 MCG: at 07:52

## 2020-12-10 RX ADMIN — LEVOTHYROXINE SODIUM 50 MCG: 50 TABLET ORAL at 07:52

## 2020-12-10 RX ADMIN — PROCHLORPERAZINE EDISYLATE 10 MG: 5 INJECTION INTRAMUSCULAR; INTRAVENOUS at 00:45

## 2020-12-10 RX ADMIN — OXYCODONE HYDROCHLORIDE 5 MG: 5 TABLET ORAL at 07:54

## 2020-12-10 RX ADMIN — OXYCODONE HYDROCHLORIDE 5 MG: 5 TABLET ORAL at 12:58

## 2020-12-10 RX ADMIN — DOCUSATE SODIUM AND SENNOSIDES 1 TABLET: 8.6; 5 TABLET ORAL at 07:53

## 2020-12-10 RX ADMIN — OXYCODONE HYDROCHLORIDE 10 MG: 5 TABLET ORAL at 03:54

## 2020-12-10 RX ADMIN — ACETAMINOPHEN 975 MG: 325 TABLET, FILM COATED ORAL at 03:54

## 2020-12-10 RX ADMIN — CEFAZOLIN 1 G: 1 INJECTION, POWDER, FOR SOLUTION INTRAMUSCULAR; INTRAVENOUS at 00:10

## 2020-12-10 RX ADMIN — GABAPENTIN 300 MG: 300 CAPSULE ORAL at 07:52

## 2020-12-10 ASSESSMENT — ACTIVITIES OF DAILY LIVING (ADL)
ADLS_ACUITY_SCORE: 16

## 2020-12-10 NOTE — CONSULTS
Consult Date:  2020      REASON FOR CONSULTATION:  Medical evaluation in a postoperative patient.      HISTORY OF PRESENT ILLNESS:  Mary Willard is a 49-year-old female who is status post cervical 4-6 anterior diskectomy and fusion for spinal stenosis in cervical region and myelopathy.  Mary has a significant past medical history of diabetes mellitus type 1, on insulin pump; morbid obesity with BMI of 50, hyperlipidemia, hypothyroidism, obstructive sleep apnea on CPAP, who is immediately postop of cervical 4-6 diskectomy and fusion for myelopathy and spinal stenosis of the cervical region.      The patient had a smooth postoperative course postop.  She is well versed with her insulin pump.  She tolerated oral intake.  She is back on her insulin pump.  Her last glucose was 188.  The pain is fairly controlled.  No nausea or vomiting.  She was able to get up out of bed to the bathroom.  She still has a tingling sensation on her upper extremities, which she stated is actually improving.      PAST MEDICAL HISTORY:   1.  Diabetes mellitus type 1, on insulin pump.   2.  Morbid obesity with body mass index of 50.   3.  Anxiety.   4.  History of cholelithiasis.   5.  Obstructive sleep apnea.   6.  Hyperlipidemia.   7.  Depressive disorder.      PAST SURGICAL HISTORY:   1.   section.   2.  Cholecystectomy.   3.  Carpal tunnel syndrome.   4.  Trigger finger.   5.  Tubal ligation.      FAMILY HISTORY:  Significant for her mother with diabetes, depression and hypertension.  Brother with hypertension and alcoholism, and obstructive sleep apnea.  Father with hypertension.        SOCIAL HISTORY:  She quit smoking in .  She occasionally drinks wine.  She does not use illicit drugs.      REVIEW OF SYSTEMS:  Ten points reviewed, all are negative except those mentioned in history of present illness.      HOME MEDICATIONS:  Reviewed and reconciled as in electronic medical record including Lipitor, insulin pump,  levothyroxine, aspirin, among others.      ALLERGIES:  BENADRYL, HYDROCODONE, AND PENICILLIN.      PHYSICAL EXAMINATION:   GENERAL:  The patient is awake and alert, not in distress.   VITAL SIGNS:  Blood pressure 160/87, pulse rate 78, temperature 97.4, oxygen saturation 98% on room air.   HEENT:  Pink, nonicteric.  Moist oral mucosa.  Extraocular muscle movement intact.   NECK:  Surgical area dressed, no drainage.   CHEST:  Good air entry bilaterally.  No wheezing, crackles or rales.   CARDIOVASCULAR:  S1 and S2 were heard, no gallop or murmur.  Regular.   ABDOMEN:  Obese, soft, nontender, nondistended.  Insulin pump and glucose monitoring on abdominal area.   EXTREMITIES:  Trace pedal edema, no deformity.   PSYCHIATRIC:  Normal mood and affect, keeps eye contact, responds to questions appropriately.      DIAGNOSTIC TESTS OF INTEREST:  Glucose is 188.  COVID-19 test done on 12/07/2020 was negative.     Patient had an EKG on 11/18/2020 for a preop, which is normal sinus rhythm.        ASSESSMENT AND PLAN:  Mary Willard is a 49-year-old female with a history of diabetes mellitus type 1, on insulin pump; morbid obesity, obstructive sleep apnea, hypothyroidism, depression, who is immediately post cervical 4-6 anterior diskectomy and fusion and admitted to the hospital post-procedure.   1.  Immediate postop status post cervical 4-6 anterior cervical diskectomy and fusion.  Pain is fairly controlled.  Wound care, DVT prophylaxis, PT/OT per Orthopedic Service.   2.  Diabetes mellitus type 1.  At baseline patient is on an insulin pump, which is basal Humalog 2.45 units per hour at midnight and at 4:30 a.m. increased to 3 units per hour and then at 2:30 p.m. to 3.25 units per hour, at 9:30 p.m. it also decreased to 3.  She also has a carb count and gives herself boluses.  The patient is well aware of insulin management, will get some blood glucose checked at least 4 times a day and to help manage especially today post  anesthesia to decrease risk of hypoglycemia.   3.  Morbid obesity and obstructive sleep apnea.  The patient has BMI of about 50.  She has obstructive sleep apnea and she has a CPAP, which she will use at home setting.   4.  Hypothyroidism.  Continue Synthroid at home dose.   5.  Hyperlipidemia.  The patient is on Lipitor, which will be continued in 1-2 days.        I discussed with the patient at length the plan of care, also discussed with RN.  All patient's questions and concerns addressed.      Thank you for this consultation.  One of my colleagues will follow up the patient in the morning.         CHE VAUGHAN MD             D: 2020   T: 2020   MT: CHANELL      Name:     ALPESH GRAJEDA   MRN:      [id number]        Account:       [id number]   :      1970           Consult Date:  2020      Document: [id number]

## 2020-12-10 NOTE — OP NOTE
Procedure Date: 12/09/2020      PREOPERATIVE DIAGNOSES:    1.  Cervical myelopathy.   2.  Severe spinal stenosis, predominantly at C4-C5 and C5-C6.   3.  Cervical radiculopathy.   4.  Severe spinal osteoarthritis with massive anterior osteophytes.     5.  Morbid obesity with body mass index greater than 50.   6.  Cord myelomalacia.      POSTOPERATIVE DIAGNOSES:   1.  Cervical myelopathy.   2.  Severe spinal stenosis, predominantly at C4-C5 and C5-C6.   3.  Cervical radiculopathy.   4.  Severe spinal osteoarthritis with massive anterior osteophytes.     5.  Morbid obesity with body mass index greater than 50.   6.  Cord myelomalacia.      PROCEDURES:   1.  C4-C5 anterior cervical discectomy and fusion.   2.  Cervical 5-6 anterior cervical discectomy and fusion.   3.  Application of intervertebral biomechanical device for interbody fusion purposes at C4-C5 and C5-C6 using the interbody PEEK cage device.  Riaz Bradford PEEK cage 12 x 14 x 8 mm.  One cage per level.   4.  Full decompression of the spinal cord with removal of the entire disc through the posterior longitudinal ligament, both at C4-C5 and C5-C6.   5.  Local autograft bone for interbody fusion purposes at C4-C5 and C5-C6.   6.  Crushed cancellous allograft bone graft extender for interbody fusion purposes at C4-C5 and C5-C6.   7.  Anterior instrumentation using a standard anterior plate and screw system.  Riaz Invizia 42 mm plate with 6 variable bone screws 4.2 x 13 mm solid endpoints.   8.  Take down of massive osteophytes at both C4-C5 and C5-C6.   9.  Fluoroscopy.   10.  Microscopy.      SURGEON:  John Silver MD      ASSISTANT:  Tray Santiago PA-C      ANESTHESIA:  General endotracheal without complications.      COMPLICATIONS:  None.      DRAINS:  One Hemovac drain placed prior to closure.      ESTIMATED BLOOD LOSS:  Approximately 50 mL.      FINDINGS:  Full decompression of C4-C5 and C5-C6.  This was a notably difficult case given her morbid  obesity as well as the massive osteophytes, making exposure very lengthy, and in fact all aspects of the case were quite difficult given her body habitus, which I did make her quite aware of preoperatively.      OPERATIVE INDICATIONS:  Mary is a 49-year-old female who had [facility] essentially for a second opinion.  She had a history of cervical spine myelomalacia with severe spinal cord compression, namely at C4-C5 and C5-C6.  It became apparent that she had been evaluated more than 1 time through [facility] [place] in the past; however, she decided to transfer her care to my clinic.  In any case, she had significant spinal stenosis at C4-C5 with spinal cord myelomalacia and significant spinal stenosis at the C5-C6 as well.  She had signs and symptoms of cervical myelopathy.  She essentially had been putting off surgery for quite some time, up to 3 years, but due to her worsening symptoms, she felt that she needed to pursue surgical intervention.  We discussed the above-mentioned procedure as a possible means to improve, alleviate, or at least stabilize her neurologic condition.  We had a very long discussion related to the nature of and the treatment options.  Regarding surgery, we discussed that this procedure performed today might actually be only stage 1 of 2 stages, with the second stage being a more extensive posterior fusion decompression based on the multilevel stenosis present.  After reviewing her preoperative imaging including x-rays and MRIs and CT scans as well as a long conversation over the phone with all results that were available, she elected to proceed with the 2-stage approach.  Today was stage 1.      Today Mary was seen in the preoperative area.  All questions were answered.  Skin was marked.  Consent was signed by both parties.  After finding no contraindications to proceeding with surgery in the preoperative anesthesia assessment, she was brought  "to the operating room.      DESCRIPTION OF PROCEDURE:  Once in the OR, she was successfully sedated, and an ET tube was placed.  Blanchard catheter was placed under sterile conditions.  She was positioned supine on a standard operating table with a bump under the shoulders.  Her head was stabilized on a circular foam pillow.  Tape was used to distract the shoulders towards the end of the bed on some of the soft tissues distal on her anterior chest wall.  We were careful not to over-distract on the skin with adhesive.  We did our best to maximize our exposure given her body habitus; however, we did have notable limitations related to her body habitus.  Prior to prepping and draping, we did utilize fluoroscopy to identify our visualized field, and it was felt to be adequate.  Arms were at her side.  Bony prominences were well padded.  She was gently wrapped \"mummy\" style, securing arms towards her side throughout the procedure today.  With all this completed, we then prepped and draped the anterior cervical spine.  A timeout was performed and IV antibiotics were administered.   We began by more closely, identifying the skin overlying our surgical approach from C4 to C6.  The skin was marked within Langerhans lines.  I utilized a left-sided approach.  The proposed skin incision was infiltrated with Marcaine mixed with epinephrine.  A #10 blade was used to create an incision from the midline over to the proposed region of the sternocleidomastoid, as it was notably palpable.  An incision was created with a #10 blade.  I dissected down through the deep subcutaneous tissues to the platysma, which was split.  We then continued a blunt dissection down with the strap muscles medially, along with the sternocleidomastoid and carotid sheath laterally.  I was able to palpate the carotid artery laterally.  We continued our medial exposure down to palpate the anterior cervical spine eventually.  Handheld retractors were applied.  She " had massive osteophytes at C4-C5 and C5-C6, which took quite a bit of extra time to deal with today, as they had to be taken down individually prior to our normal exposure.  Handheld retractors were placed in the wound.  We did some cursory exposure of the anterior cervical spine, sweeping the longus colli laterally.  We did not identify any particular soft tissue injury throughout our approach, although it did take extra time due to the deep nature.  We verified our location over C4-C5 and continued.  Handheld retractors were replaced with Trimline retractors over C4-C5, and we continued.      We began by making a subperiosteal dissection over C4-C5, with partial visualization of the inferior C4 vertebral body and the osteophyte, which was notably large, which flowed down to C5.  This was taken down with a Leksell rongeur once the subperiosteal exposure was completed.  Bone was saved and cleaned on the back table.  This bone was added with crushed cancellous allograft bone graft extender today for interbody fusion purposes later in the case.  With the rather massive osteophyte taken down, I was able to identify the disc space at C4-C5.  We did take one more lateral picture with the fluoroscope to identify C4-C5 and continued.  [place] pins were placed at C4 and C5, and distraction was gently applied.  Operating microscope was brought into the field at this point.  An annulotomy was created through C4-C5, and we proceeded with our standard discectomy utilizing multiple instruments, including curets, pituitaries and a high-speed bur.  The decompression was quite substantial, including posterior osteophytes, both at C4 and C5.  The decompression was taken into the uncovertebral joints bilaterally.  Ultimately, the disc was removed in its entirety down to the bleeding bony endplates.  I then spent time meticulously dissecting off the posterior longitudinal ligament and some notable chronic disc calcifications that were  present as well.  I was able to take down the PLL ultimately with a great deal of time spent on careful blunt dissection, until we could fully visualize the spinal cord through the entire width of our exposure.  This took quite some time.  We did not identify any complications during this procedure such as a CSF leak or nerve injury.  I made my best attempt to sweep any loose material from behind the vertebral bodies of C4 and C5 during this process as well.  Once our discectomy was completed, I then trialed.  We identified that a trial small footprint 8 mm height PEEK cage was the most appropriate size.  We did verify its location with fluoroscopy.  Ultimately, the trial was replaced with our final PEEK cage, which had been filled on the back table with both local autograft and allograft bone.  This was advanced into the disc space under direct fluoroscopic guidance to its final resting place, which we did verify with fluoroscopy.  This completed the decompression and interbody fusion at C4-C5.  We then transitioned our retractor scheme distally.  This took quite a bit of extra time due to the large anterior osteophyte which also existed at C5-C6.  In fact, the osteophyte at C5-C6 was more complex than that of C4-C5 even.  Ultimately, this osteophyte was taken down through a subperiosteal exposure to the native anterior level of the vertebral body at C5-C6.  D[place]ham pins were applied and distracted.  We then performed essentially the same exact technique at C5-C6 with discectomy, drilling and takedown of the PLL for a full decompression.  We trialed up to the same size interbody and ultimately replaced our trial with our 12 x 14 x 8 mm PEEK cage filled with local autograft as well as allograft bone.  Its position was verified with fluoroscopy.  This completed our decompression and interbody fusion at C4-C5 and C5-C6.      Lastly, we moved onto application of anterior instrumentation for fusion purposes.  We trialed  sizes and ultimately found that a Riaz Invizia plate 42 mm in length was most appropriate.  Her bone was very sclerotic, which required more meticulous placement of our instrumentation.  However, ultimately 6 variable self-tapping screws 13 mm in length were all placed successfully.  All screws had excellent bite.  This completed our anterior fusion.  The wound was copiously irrigated and suctioned.  Final x-rays were obtained, showing excellent placement of hardware without complication.  Lastly, I took one final inspection through the surgical bed.  There was no evidence of ongoing bleeding into the disc space.  No evidence of CSF leak or nerve injury during the procedure.  I inspected the wound.  I did not identify any notable injuries to the soft tissues, and there certainly was no evidence of bleeding throughout the soft tissues either.  The retractors were finally removed.  All bone holes used for our [place] pins were filled with Gelfoam paste and bone wax as we moved along.  Final inspection identified no complications.  The wound was copiously irrigated and suctioned.  Prior to completion, we did place a MARLEN drain, which was placed directly on the anterior cervical spine under direct visualization using an atraumatic instruments.  This was taken out a separate poke hole and tied to the skin with nylon stitch to prevent back-out.  Counts were correct.  No complications.  We then moved on with closure including the platysma, subcutaneous tissue and skin.  The wound was cleaned and dried.  Dermabond was applied.  Sterile dressings were applied.  Drapes were taken down, and Mary was eventually transitioned from the operating table onto a hospital cart.  She was later extubated and brought to the PACU in stable condition.      Tray Santiago PA-C was present through the entirety of the procedure.  He was absolutely necessary for performing the procedure today, spent a great deal of time suctioning, retracting  and working directly through the operating microscope through the entirety of the case from start to finish.  We did use fluoroscopy multiple times through the procedure for localization and placement of our instrumentation.  This was absolutely necessary.  Lastly, we did use the operating microscope quite extensively through the procedure, and it was absolutely necessary for safe decompression of the spinal cord.  We used local autograft bone mixed with crushed cancellous allograft bone graft extender.        No complications.  Counts were correct.         ANTONIO KERR MD             D: 2020   T: 12/10/2020   MT: LUIS      Name:     ALPESH GRAJEDA   MRN:      [id number]        Account:        [id number]   :      1970           Procedure Date: 2020      Document: [id number]

## 2020-12-10 NOTE — PLAN OF CARE
PM SHIFT  Pt admitted to the room 623 around 1930 and pt alert and orientated. Pt educated on the new orders, call lights, the plan. Pt stood at the bedside and used the BSC with assist of 2 and gait belt for the first time up. Pt got dizzy and nausea when up the first time. Zofran given with relief. Pt states her bilat arm has some tingling but she had it preop. Pts iv came apart and blood all the over the floor around 2320 and than pt up to the BSC again. This time just assist of 1. Pt did well. Pt does have some post op urinary hesitantly. Bladder scan pvr was only 19 ml.  dsg CDI. MARLEN patent. No neck brace ordered. Pt said the the doctor said she may or may not need one after surgery. Staff to clarify with surgery group on days. Plan is home today or tomorrow.

## 2020-12-10 NOTE — PHARMACY-ADMISSION MEDICATION HISTORY
Admission medication history interview status for this patient is complete. See [facility] admission navigator for allergy information, prior to admission medications and immunization status.     Medication history interview done via telephone during Covid-19 pandemic, indicate source(s): Patient  Medication history resources (including written lists, pill bottles, clinic record): SureScripts, Care Everywhere, and patient navigated pump settings  Ph[facility]: [facility]    Changes made to PTA medication list:  Added: none  Deleted: none  Changed: none    Actions taken by pharmacist (provider contacted, etc): Called pt to verify home med list     Additional medication history information:None    Medication reconciliation/reorder completed by provider prior to medication history?  N   (Y/N)     For patients on insulin therapy:   Do you typically eat three meals a day? 3-4 meals per day  How many times do you check your blood glucose per day? Has continuous sensor  How many episodes of hypoglycemia do you typically have per month? 1-2 per week --> 4-8 per month  Do you have a Continuous Glucose Monitor (CGM)?  Yes    Prior to Admission medications    Medication Sig Last Dose Taking? Auth Provider   acetaminophen (TYLENOL) 500 MG tablet Take 500-1,000 mg by mouth every 6 hours as needed for mild pain 12/8/2020 at Unknown time Yes Reported, Patient   atorvastatin (LIPITOR) 40 MG tablet Take 40 mg by mouth daily 12/8/2020 at Unknown time Yes Reported, Patient   calcium carbonate (CALCIUM CARBONATE) 600 MG tablet Take 1 tablet by mouth 2 times daily (with meals)  12/8/2020 at Unknown time Yes Reported, Patient   cholecalciferol (VITAMIN D3) 125 mcg (5000 units) capsule Take 125 mcg by mouth daily  12/8/2020 at Unknown time Yes Reported, Patient   INSULIN PUMP - OUTPATIENT Date Last Updated: 11/18/20  Medtronic Paradigm  BASAL RATES and times:  12   AM (midnight) to 0230: 2.3 units/hour    0230 to 0530: 3.15  units/hour    0530 to 2130: 3.1 units/hour    2130 to 2400 (midnight): 2.4 units/hour      CARB RATIO and times: 5.2  Corection Factor (Sensitivity) and times: 12 mg/dL  BLOOD GLUCOSE TARGET: 80 - 130 mg/dL  Active Insulin Time: 4 hours  Basal to Bolus Ratio: 71 % to 28.3 %  Sensor:  Yes  Pump Website Username: doesn't have  Pump Website Password: doesn't have  Yes Unknown, Entered By History   levothyroxine (SYNTHROID/LEVOTHROID) 50 MCG tablet Take 50 mcg by mouth daily 12/9/2020 at Unknown time Yes Reported, Patient   Multiple Vitamins-Minerals (MULTIVITAMIN ADULT PO) Take 1 tablet by mouth daily  12/8/2020 at Unknown time Yes Reported, Patient   aspirin 81 MG EC tablet Take 81 mg by mouth daily More than a month at Unknown time  Reported, Patient   ibuprofen (ADVIL/MOTRIN) 600 MG tablet Take 600 mg by mouth every 6 hours as needed for moderate pain More than a month at Unknown time  Reported, Patient   insulin lispro (HUMALOG VIAL) 100 UNIT/ML vial Inject Subcutaneous continuous    Reported, Patient   insulin lispro (HUMALOG) 100 UNIT/ML injection by Device route See Admin Instructions Inject 2.45 MN 4:30 3.0 2:30p 3.25, 9:30p 2.55; ins/carb /5 grams correction 1 unit per 15 mg/dl bg over 130. Maximum units daily 120.   Reported, Patient

## 2020-12-10 NOTE — PROGRESS NOTES
12/10/20 1140   Quick Adds   Type of Visit Initial PT Evaluation   Living Environment   Living Environment Comments Pt lives in house with no stairs to enter, no stairs necessary at baseline, all needs met on main level. 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 Endorses  able to physically assist if needed.   Self-Care   Usual Activity Tolerance good   Current Activity Tolerance moderate   Equipment Currently Used at Home none   Disability/Function   Difficulty Communicating no   Walking or Climbing Stairs Difficulty yes   Mobility Management Ind but some weakness/numbness in B LE for 6+ mo in B LE s/a sx for cervical stenosis    Fall history within last six months yes   Number of times patient has fallen within last six months 2  (Endorses two falls, tripping while doing yardwork and work o)   General Information   Onset of Illness/Injury or Date of Surgery 12/11/20   Referring Physician Tray Santiago PA-C   Patient/Family Therapy Goals Statement (PT) Return home   Pertinent History of Current Problem (include personal factors and/or comorbidities that impact the POC)  49-year-old female who is status post cervical 4-6 anterior diskectomy and fusion for spinal stenosis in cervical region and myelopathy.  Mary has a significant past medical history of diabetes mellitus type 1, on insulin pump; morbid obesity with BMI of 50, hyperlipidemia, hypothyroidism, obstructive sleep apnea on CPAP, who is immediately postop of cervical 4-6 diskectomy and fusion for myelopathy and spinal stenosis of the cervical region. Low complexity due to overall stable condition.    Existing Precautions/Restrictions spinal  (Cervical precautions, no hard/soft brace order)   Heart Disease Risk Factors Diabetes;High blood pressure;Dislipidemia;Overweight;Stress;Family history   Cognition   Orientation Status (Cognition) oriented x 4   Affect/Mental Status (Cognition) WNL   Pain Assessment   Patient Currently in Pain  No   Strength   Strength Comments B LE grossly decreased strength, grossly 4/5 with fn mobility tasks    Transfers   Transfer Safety Comments SBA with sit <> stand, good ability to maintain neck neutral, good awareness of safety precautions    Gait/Stairs (Locomotion)   [place] Level (Gait) supervision   Assistive Device (Gait)   (none)   Distance in Feet (Required for LE Total Joints) 150+  (no AD, SBA)   Pattern (Gait) step-through  (Some weakness noted with B hip flexin during swing)   Deviations/Abnormal Patterns (Gait) base of support, wide;delfino decreased   Balance   Balance Comments Mild balance impairments at baseline and currently noted with gait; wBOS, decreased swing ampitude bilaterally due to weakness, noting curent mild sensory loss in B LE (improved following this sx per pt), hx of 2 falls doing yardwork and with icy conditions   Sensory Examination   Sensory Perception Comments See Balance comments above; Mod sensory impairments B LE prior to sx, now mild B LE numbness noted per pt    Clinical Impression   Criteria for Skilled Therapeutic Intervention yes, treatment indicated   PT Diagnosis (PT) Weakness and balance impairments with impaired gait   Influenced by the following impairments decreased strength, balance, sensation   Functional limitations due to impairments Increased need for supervision with transfers/ambulation, overall increased risk of falls   Clinical Presentation Stable/Uncomplicated   Clinical Presentation Rationale Uncomplicated characteristics, stable/improving B LE strength/sensation following cervical sx;    Clinical Decision Making (Complexity) low complexity   Therapy Frequency (PT) One time eval and treatment only   Predicted Duration of Therapy Intervention (days/wks) eval + treat in one session, then d/c   Planned Therapy Interventions (PT) patient/family education   Anticipated Equipment Needs at Discharge (PT)   (none)   Risk & Benefits of therapy have been  explained evaluation/treatment results reviewed;participants included;patient   PT Discharge Planning    PT Discharge Recommendation (DC Rec) home with assist   PT Rationale for DC Rec Pt needs supervision level of assist with transfers and ambulation; education provided on purchasing reachers/asking for assistance at home with putting on briefs/socks and with objects > 5-10#   PT Brief overview of current status  supervision with all fn mobility of supine <> sit, sit <> stand, and ambulation with no AD   Total Evaluation Time   Total Evaluation Time (Minutes) 5

## 2020-12-10 NOTE — ANESTHESIA POSTPROCEDURE EVALUATION
Patient: Mary Willard    Procedure(s):  Cervical 4-6 anterior cervical discectomy and fusion    Diagnosis:Myelopathy (H) [G95.9]  Spinal stenosis in cervical region [M48.02]  Radiculopathy, cervical [M54.12]  Spinal osteoarthritis [M47.9]  Diagnosis Additional Information: No value filed.    Anesthesia Type:  General    Note:  Anesthesia Post Evaluation    Patient location during evaluation: PACU  Patient participation: Able to fully participate in evaluation  Level of consciousness: awake and alert  Pain management: adequate  Airway patency: patent  Cardiovascular status: acceptable  Respiratory status: acceptable  Hydration status: acceptable  PONV: controlled             Last vitals:  Vitals:    12/09/20 1743 12/09/20 1745 12/09/20 1752   BP: (!) 149/87 (!) 153/90 (!) 160/96   Pulse: 89 76 88   Resp: 15 16 15   Temp:   97.9  F (36.6  C)   SpO2: 99% 99% 96%         Electronically Signed By: Franco Finley MD  December 9, 2020  6:10 PM

## 2020-12-10 NOTE — PROGRESS NOTES
SP[facility]ITUAL HE[facility] SERVICES Progress Note   Ortho/Spine Unit    Saw pt Mary per her request for  support.  Mary reported that she had anterior cervical fusion and expressed relief and gratitude that the surgery is behind her.  She gave voice to her hope that she might not need another cervical surgery.  Mary named her spouse, the children of their blended family, and her parents as being part of her support network.  She was raised Denominational but is currently more drawn to non-L[employer] day churches; she is not affiliated with a Islam at this time.  Mary reported having no particular concerns but requested a prayer of thanksgiving, which was offered.  Provided emotional support through empathetic listening, validation of feelings, and affirmation of pt's support network.    No further plans as pt anticipated discharging later today.    Leonid Poole M.Div., [facility]  Staff   Phone [phone]

## 2020-12-10 NOTE — PROGRESS NOTES
"[facility]             Hospitalist Progress Note    Name: Mary Willard    MRN: [id number]  YOB: 1970    Age: 49 year old  Date of admission: 12/9/2020  Primary care provider: Brayden Blackman      Reason for Stay (Diagnosis): Cervical spine surgery         Assessment and Plan:      Summary of Stay:  Mary Willard is a 49-year-old female with a history of diabetes mellitus type 1, on insulin pump; morbid obesity, obstructive sleep apnea, hypothyroidism, depression, who is immediately post cervical 4-6 anterior diskectomy and fusion and admitted to the hospital post-procedure.      Primary diagnoses    Postop day #1 cervical 4-6 anterior discectomy and fusion.  Patient subjectively better with less numbness.  -DVT prophylaxis, pain control and wound care per orthopedic.    Diabetes mellitus type 1, chronically on an insulin pump  At baseline uses Humalog 2.45 units/h at midnight with switch to 3 units/h at 4:30 AM and then back to 3.25 units/h at 230.  -Patient may manage on insulin pump while inpatient    Morbid obesity with sleep apnea    Hypothyroidism  -Continue Synthroid    Hyperlipidemia  -May resume Lipitor at discharge      DVT Prophylaxis: Defer to primary service  Code Status: Full Code  Discharge Dispo: home   Estimated Disch Date / # of Days until Disch: later today per ortho        Interval History (Subjective):      She reports sleeping through MRI despite no sedatives.  Feels that hands are less numb and feels better than when she first came in         Physical Exam:      Vital signs:  Temp: 97.9  F (36.6  C) Temp src: Temporal BP: (!) 142/63 Pulse: 73   Resp: 16 SpO2: 96 % O2 Device: None (Room air) Oxygen Delivery: 4 LPM Height: 154.9 cm (5' 1\")(per pt) Weight: 121.6 kg (268 lb)  Estimated body mass index is 50.64 kg/m  as calculated from the following:    Height as of this encounter: 1.549 m (5' 1\").    Weight as of this encounter: 121.6 kg (268 lb).    I/O " last 3 completed shifts:  In: 5874 [P.O.:1270; I.V.:4604]  Out: 2700 [Urine:2550; Emesis/NG output:100; Drains:50]  Vitals:    11/27/20 1300 12/09/20 1011   Weight: 122 kg (269 lb) 121.6 kg (268 lb)       Constitutional: Awake, alert, cooperative, no apparent distress     Respiratory: Nl work of breathing. Clear to auscultation bilaterally, no crackles or wheezing   Cardiovascular: Regular rate and rhythm, normal S1 and S2, and no murmur noted       Skin: No rashes, no cyanosis, dry to touch   Neuro: CN 2-12 intact, no localizing weakness   Extremities: No edema, normal range of motion   HEENT Normocephalic, atraumatic, normal nasal turbinates; oropharynx clear   Neck Supple; nl inspection; trachea midline; no thryomegaly   Psychiatric: A+O x3. Normal affect          Medications:      All current medications were reviewed with changes reflected in problem list.         Data:      All new lab and imaging data was reviewed.   Labs:  No results for input(s): WBC, HGB, HCT, MCV, PLT in the last 168 hours.  Recent Labs   Lab 12/10/20  0647      POTASSIUM 4.0   CHLORIDE 109   CO2 31   ANIONGAP <1*   *   BUN 9   CR 0.57   GFRESTIMATED >90   GFRESTBLACK >90   ALEJANDRO 8.8      Imaging:   Recent Results (from the past 24 hour(s))   XR Surgery OCTAVIANO L/T 5 Min Fluoro w Stills    Narrative    This exam was marked as non-reportable because it will not be read by a   radiologist or a [facility] non-radiologist provider.         MR Cervical Spine w/o Contrast    Narrative    MRI OF THE CERVICAL SPINE WITHOUT CONTRAST 12/10/2020 8:53 AM    COMPARISON: None    HISTORY: Status post C4-C6 ACDF. Myelopathy. Central stenosis.  Radiculopathy.    TECHNIQUE: Multiplanar, multisequence MRI images of the cervical spine  were acquired without intravenous contrast.    FINDINGS: There are postoperative changes from recent anterior and  interbody fusion at C4-5 and C5-6. Alignment is normal. There is  moderate prevertebral fluid at the  operative site. No abnormal  epidural fluid collection is identified. There is fluid and debris  along the operative corridor along the left side of the neck. There is  no marrow edema. There is T2 signal hyperintensity within the spinal  cord at the C4-5 and C5-6 levels, likely representative of  myelomalacia versus edema. The spinal canal is congenitally small from  C3 to C6.    C2-3: Spinal canal and neural foramina are patent.    C3-4: Congenitally small spinal canal. Spinal canal is mildly narrow.  Uncinate spurring causes severe foraminal bilaterally. The facets are  unremarkable.    C4-5: Changes from ACDF. Congenitally small spinal canal. Spinal canal  is mildly narrow. Uncinate spurring causes severe foraminal narrowing  bilaterally. The facets are unremarkable.    C5-6: Changes from ACDF. The spinal canal is congenitally small and  moderately narrowed. Uncinate spurring causes severe foraminal  narrowing bilaterally. The facets are unremarkable.    C6-7: Generalized disc bulge and osteophyte superimposed upon a  congenitally small spinal canal causing moderate central spinal canal  narrowing. The spinal cord is deformed. Uncinate spurring causes  severe foraminal narrowing bilaterally.    C7-T1: Spinal canal and neural foramina are patent.      Impression    IMPRESSION:  1. Postoperative changes from ACDF at C4-5 and C5-6. There is  postoperative fluid/debris in the prevertebral tissues underlying the  operative corridor on the left side. No epidural fluid collection is  identified.  2. T2 signal hyperintensity within the central spinal cord at the C4-5  and C5-6 levels, most likely due to myelomalacia. No conclusive  evidence of cord edema.  3. Congenitally small spinal canal superimposed disc degeneration. A  bulging disc at C6-7 causes moderate central spinal stenosis with  deformity of the cord. There is also moderate central spinal stenosis  on a congenital basis at C5-6 and mild spinal stenosis at  C3-4 and  C4-5.  4. Chronic disc/osteophyte complexes cause severe neural foraminal  stenosis bilaterally from C3-4 to C6-7.       Maxime Tobias [phone]

## 2020-12-10 NOTE — PLAN OF CARE
Physical Therapy Discharge Summary    Reason for therapy discharge:    Discharged to home with spouse. Ambulating well, close to baseline.     Progress towards therapy goal(s). See goals on Care Plan in Psychiatric electronic health record for goal details.  Goals met    Therapy recommendation(s):    No further therapy is recommended.

## 2020-12-10 NOTE — PROGRESS NOTES
"Ortho Rounding Note    S: Pt in bed, overall reporting being quite comfortable this AM. Episode of n/v overnight - now resolved. N/t to bi UE per baseline however pt reports slight improvement. Denies n/f/c, SOB, CP.     O:  Vital signs:   Blood pressure (!) 141/57, pulse 72, temperature 97.4  F (36.3  C), temperature source Temporal, resp. rate 16, height 1.549 m (5' 1\"), weight 121.6 kg (268 lb), SpO2 91 %.  Estimated body mass index is 50.64 kg/m  as calculated from the following:    Height as of this encounter: 1.549 m (5' 1\").    Weight as of this encounter: 121.6 kg (268 lb).      Intake/Output Summary (Last 24 hours) at 12/10/2020 0612  Last data filed at 12/10/2020 0200  Gross per 24 hour   Intake 4813 ml   Output 1980 ml   Net 2833 ml         Drain intact  Dressings c/d/i  5/5 motor and SPLT in BL UE and LE except for decreased sensation at the left 1st digit, 4/5 left hand intrinsics - per baseline     A:  POD #1 s/p C4-6 anterior cervical discectomy and fusion due to cervical myelopathy     P:  General: Doing very well this AM. Okay to pull MARLEN drain this AM. PT/OT- please provide home safety eval prior to possible discharge. MRI cervical spine to be completed prior to discharge today.   Pain: PO  Act: up ad cheryl, with therapy  DVT: [facility] only  ID: routine postop abx to be completed 24 hours after surgery  Dispo: Plan for possible discharge if passes PT/OT. OK to DC once passes PT, medically stable, tolerating food, urinating  Appreciate Medicine consult for medical management    Tray Santiago PA-C    "

## 2020-12-10 NOTE — DISCHARGE INSTRUCTIONS
Discharge Instructions for Cervical Fusion  You had a cervical fusion. During this procedure, your healthcare provider locked together (fused) some of the bones in the curve of your neck. This limits the movement of these bones to help ease your pain. Here s what you need to know about home care after this surgery.  Activity  Do's and don'ts include:     Arrange your household to keep the items you need within reach.    Remove electrical cords, throw rugs, and anything else that may cause you to fall.    Follow your healthcare provider s instructions for wearing a cervical collar or brace. The neck collar or brace supports and correctly positions your neck after surgery. Be sure to follow instructions for its care and use, including how long you must wear it.    Don t bend or twist at the waist, or raise your hands over your head for 2 week(s) after your surgery.    Don t drive until your healthcare provider says it s OK. This will most likely be when you can move your neck from side to side freely and without pain. Never drive while you are on opioid pain medicine.    Walk as much as possible. You may also go up and down stairs. Walking outside or walking on a treadmill at a slow speed with no incline is OK.    Don t lift anything heavier than 5 pounds.    Ask your healthcare provider when you can go back to work.  Other home care       Take your medicine exactly as directed. Talk to your healthcare provider about pain medicine.    Don t take nonsteroidal anti-inflammatory drugs (NSAIDs), such as aspirin and ibuprofen unless your healthcare provider says it's OK to do so. They may delay or block bone fusion.     Don t rub the incision. Don't put creams or lotions on it.    Don t soak in bathtubs, hot tubs, or swimming pools until told by your healthcare provider.    Your incision may have been closed using sutures, staples, or strips of tape. If you have sutures or staples, they may need to be removed 2 to  3 weeks after surgery. You can allow strips of tape to fall off on their own.    If you smoke, quit. Nicotine from any source (cigarettes, patches, chewing tobacco) slows healing of bone and you may need more surgery. Join a stop-smoking program to improve your chances of success.  Follow-up    Make a follow-up visit.    Keep appointments for X-rays. They will be taken often to check the status of the cervical fusion.    When to call 911 or your healthcare provider  Call 911 right away if you have:    Chest pain    Shortness of breath    Trouble controlling your bowels or bladder    Painful calf that is warm to the touch and tender with pressure  Call your healthcare provider right away if you have:    Drainage, redness, or warmth at the incision    Fever of 100.4 F (38 C) or higher, or as directed by your healthcare provider    Shaking chills    Weakness, tingling, or any new numbness in your arms or legs    Increased pain    Trouble swallowing  Kadeem last reviewed this educational content on 4/1/2018 2000-2020 [employer]. [address] [place] [id number]. All rights reserved. This information is not intended as a substitute for professional medical care. Always follow your healthcare professional's instructions.

## 2020-12-10 NOTE — PLAN OF CARE
NOC SHIFT  Pt started off the shift stating she was not nausea and than out of no where she became nausea and had an 100 ml emesis. It was immediately after pt swallowed the oxycodone. No pills noted in emesis. Pt given compazine with relief. Pt tolerated a popsicle around 0230.  Pt up to the BSC with assist of 1. Pt has some hesitancy when voiding but feels like she empties her bladder all the way. Pt has some tingling in her right arm. Pt had baseline but states it feels better since surgery. Pt has her own blood glucose monitor continuously on with a basal of insulin infusing. LS clear. BS hypo. No flatus yet but multiple large belches. Pt may discharge home today but needs to be seen by PT and OT and the patient informed this staff and the pacu staff that the surgeon told her she would get an  MRI before discharge to determine if she needs another surgery in a few months. No MRI ordered. Pt informed staff to talk to the surgeon about it today..

## 2020-12-15 NOTE — DISCHARGE SUMMARY
[facility] Discharge Summary    Mary Willard MRN# [id number]   Age: 49 year old YOB: 1970     Date of Admission:  12/9/2020  Date of Discharge::  12/10/2020  1:51 PM  Admitting Physician:  John Silver MD  Discharge Physician:  Tray Santiago PA-C     Home clinic: [facility]           Admission Diagnoses:   Myelopathy (H) [G95.9]  Spinal stenosis in cervical region [M48.02]  Radiculopathy, cervical [M54.12]  Spinal osteoarthritis [M47.9]          Discharge Diagnosis:   Patient Active Problem List    Diagnosis     S/P cervical spinal fusion             Procedures:   Procedure(s): C4-C6 anterior cervical discectomy and fusion                Medications Prior to Admission:     No medications prior to admission.             Discharge Medications:     Discharge Medication List as of 12/10/2020 12:37 PM      START taking these medications    Details   hydrOXYzine (ATARAX) 25 MG tablet Take 1 tablet (25 mg) by mouth every 6 hours as needed for itching, Disp-15 tablet, R-0, Local Print      methocarbamol (ROBAXIN) 750 MG tablet Take 1 tablet (750 mg) by mouth 4 times daily as needed for muscle spasms, Disp-20 tablet, R-0, Local Print      ondansetron (ZOFRAN-ODT) 4 MG ODT tab Take 1 tablet (4 mg) by mouth every 6 hours as needed for nausea or vomiting, Disp-15 tablet, R-0, Local Print      oxyCODONE-acetaminophen (PERCOCET) 5-325 MG tablet Take 1-2 tablets by mouth every 6 hours as needed for pain, Disp-20 tablet, R-0, Local Print      senna-docusate (SENOKOT-S/PERICOLACE) 8.6-50 MG tablet Take 1 tablet by mouth 2 times daily, Disp-20 tablet, R-0, Local Print         CONTINUE these medications which have NOT CHANGED    Details   atorvastatin (LIPITOR) 40 MG tablet Take 40 mg by mouth daily, Historical      calcium carbonate (CALCIUM CARBONATE) 600 MG tablet Take 1 tablet by mouth 2 times daily (with meals) , Historical      cholecalciferol (VITAMIN D3) 125 mcg (5000 units) capsule  Take 125 mcg by mouth daily , Historical      !! insulin lispro (HUMALOG VIAL) 100 UNIT/ML vial Inject Subcutaneous continuous , Historical      !! insulin lispro (HUMALOG) 100 UNIT/ML injection by Device route See Admin Instructions Inject 2.45 MN 4:30 3.0 2:30p 3.25, 9:30p 2.55; ins/carb /5 grams correction 1 unit per 15 mg/dl bg over 130. Maximum units daily 120., Historical      INSULIN PUMP - OUTPATIENT Date Last Updated: 11/18/20  [employer]  BASAL RATES and times:  12   AM (midnight) to 0230: 2.3 units/hour    0230 to 0530: 3.15 units/hour    0530 to 2130: 3.1 units/hour    2130 to 2400 (midnight): 2.4 units/hour      CARB RATIO and times: 5.2   Corection Factor (Sensitivity) and times: 12 mg/dL  BLOOD GLUCOSE TARGET: 80 - 130 mg/dL  Active Insulin Time: 4 hours  Basal to Bolus Ratio: 71 % to 28.3 %  Sensor:  Yes  Pump Website Username: doesn't have  Pump Website Password: doesn't have, Histor ical      levothyroxine (SYNTHROID/LEVOTHROID) 50 MCG tablet Take 50 mcg by mouth daily, Historical       !! - Potential duplicate medications found. Please discuss with provider.      STOP taking these medications       acetaminophen (TYLENOL) 500 MG tablet Comments:   Reason for Stopping:         aspirin 81 MG EC tablet Comments:   Reason for Stopping:         ibuprofen (ADVIL/MOTRIN) 600 MG tablet Comments:   Reason for Stopping:         Multiple Vitamins-Minerals (MULTIVITAMIN ADULT PO) Comments:   Reason for Stopping:                     Consultations:   PT, OT, Hospitalist               Hospital Course:   The patient's hospital course was unremarkable.  She recovered as anticipated and experienced no post-operative complications.           Discharge Instructions and Follow-Up:   Discharge diet: Regular   Discharge activity: Activity as tolerated  Minimize bending, lifting, twisting. No lifting greater than 10 lbs. Remember, 1 gallon of milk is 8 lbs.     Discharge follow-up: Follow up with Tray Santiago,  BLUE in two weeks at [facility]. Please call Laureen [phone] to schedule.      Wound care: You incision is covered with an Aquacel dressing. This is a waterproof dressing that you are able to shower with. Do not submerge your dressing in water. Do not remove dressing until you are seen in clinic for your two week post op visit.     However, if you notice a significant amount of drainage on your dressing, or there is any concern for possible incision infection, remove to inspect the incision.    If you do remove the dressing prior to your two week visit, please cover with simple dressing. We suggest a folded piece of gauze with a few pieces of tape to hold in place. This will allow the incision to remain protected. Please make sure to cover your dressing with plastic/waterproof dressing to keep it waterproof if you have removed the initial dressing while in the shower. We ask that you continue to waterproof it until you are seen at your two week post op appointment.              Discharge Disposition:   Discharged to home      Attestation:  I have reviewed today's vital signs, notes, medications, labs and imaging.    Tray Santiago PA-C

## (undated) DEVICE — DRAIN JACKSON PRATT 10FR ROUND SU130-1321

## (undated) DEVICE — DRAPE STERI TOWEL LG 1010

## (undated) DEVICE — DRAPE X-RAY TUBE 00-901169-01-OEC

## (undated) DEVICE — CATH IV ANGIO INTRO 12GA 382277

## (undated) DEVICE — SUCTION FRAZIER 12FR W/OBTURATOR 33120

## (undated) DEVICE — PACK SMALL SPINE RIDGES

## (undated) DEVICE — LINEN FULL SHEET 5511

## (undated) DEVICE — LINEN HALF SHEET 5512

## (undated) DEVICE — SU ETHILON 3-0 PS-2 18" 1669H

## (undated) DEVICE — LINEN ORTHO ACL PACK 5447

## (undated) DEVICE — SPONGE COTTONOID 1/2X1/2" 80-1400

## (undated) DEVICE — BAG CLEAR TRASH 1.3M 39X33" P4040C

## (undated) DEVICE — SOL NACL 0.9% IRRIG 1000ML BOTTLE 2F7124

## (undated) DEVICE — SU MONOCRYL 4-0 PS-2 27" UND Y426H

## (undated) DEVICE — ADH SKIN CLOSURE PREMIERPRO EXOFIN MICOR HV 0.5ML 3471

## (undated) DEVICE — DRAPE C-ARMOR 5 SIDED 5523

## (undated) DEVICE — GLOVE PROTEXIS BLUE W/NEU-THERA 8.5  2D73EB85

## (undated) DEVICE — SPONGE KITTNER 30-101

## (undated) DEVICE — TUBING SUCTION 12"X1/4" N612

## (undated) DEVICE — SYR 10ML LL W/O NDL 302995

## (undated) DEVICE — ESU GROUND PAD ADULT W/CORD E7507

## (undated) DEVICE — DRAIN JACKSON PRATT RESERVOIR 100ML SU130-1305

## (undated) DEVICE — SU VICRYL 2-0 CT-2 27" J333H

## (undated) DEVICE — TOOL DISSECT MIDAS MR8 14CM MATCH HEAD 3MM MR8-14MH30

## (undated) DEVICE — ESU ELEC BLADE 2.75" COATED/INSULATED E1455

## (undated) DEVICE — NDL SPINAL 25GA 3.5" QUINCKE 405180

## (undated) DEVICE — SUCTION MANIFOLD NEPTUNE 2 SYS 4 PORT 0702-020-000

## (undated) DEVICE — DRAPE MICROSCOPE OPMI ZEISS 48X118" 306071-0000-000

## (undated) DEVICE — LINEN DRAPE 54X72" 5467

## (undated) DEVICE — PIN DISTRACTION ANCHOR FOR SCR 12MM MDS9091212 909-12

## (undated) DEVICE — SU VICRYL 3-0 PS-2 27" UND J427H

## (undated) DEVICE — GLOVE PROTEXIS W/NEU-THERA 8.0  2D73TE80

## (undated) DEVICE — LINEN POUCH DBL 5427

## (undated) DEVICE — TAPE DURAPORE 3" SILK 1538-3

## (undated) DEVICE — SPONGE SURGIFOAM 01GM POWDER 1978

## (undated) DEVICE — CATH TRAY FOLEY SURESTEP 16FR W/URNE MTR STLK LATEX A303316A

## (undated) RX ORDER — LIDOCAINE HYDROCHLORIDE 10 MG/ML
INJECTION, SOLUTION EPIDURAL; INFILTRATION; INTRACAUDAL; PERINEURAL
Status: DISPENSED
Start: 2020-12-09

## (undated) RX ORDER — DEXAMETHASONE SODIUM PHOSPHATE 4 MG/ML
INJECTION, SOLUTION INTRA-ARTICULAR; INTRALESIONAL; INTRAMUSCULAR; INTRAVENOUS; SOFT TISSUE
Status: DISPENSED
Start: 2020-12-09

## (undated) RX ORDER — FENTANYL CITRATE 50 UG/ML
INJECTION, SOLUTION INTRAMUSCULAR; INTRAVENOUS
Status: DISPENSED
Start: 2020-12-09

## (undated) RX ORDER — NEOSTIGMINE METHYLSULFATE 1 MG/ML
VIAL (ML) INJECTION
Status: DISPENSED
Start: 2020-12-09

## (undated) RX ORDER — FENTANYL CITRATE-0.9 % NACL/PF 10 MCG/ML
PLASTIC BAG, INJECTION (ML) INTRAVENOUS
Status: DISPENSED
Start: 2020-12-09

## (undated) RX ORDER — EPHEDRINE SULFATE 50 MG/ML
INJECTION, SOLUTION INTRAMUSCULAR; INTRAVENOUS; SUBCUTANEOUS
Status: DISPENSED
Start: 2020-12-09

## (undated) RX ORDER — ACETAMINOPHEN 325 MG/1
TABLET ORAL
Status: DISPENSED
Start: 2020-12-09

## (undated) RX ORDER — ONDANSETRON 2 MG/ML
INJECTION INTRAMUSCULAR; INTRAVENOUS
Status: DISPENSED
Start: 2020-12-09

## (undated) RX ORDER — CEFAZOLIN SODIUM 1 G/3ML
INJECTION, POWDER, FOR SOLUTION INTRAMUSCULAR; INTRAVENOUS
Status: DISPENSED
Start: 2020-12-09

## (undated) RX ORDER — PROPOFOL 10 MG/ML
INJECTION, EMULSION INTRAVENOUS
Status: DISPENSED
Start: 2020-12-09

## (undated) RX ORDER — GLYCOPYRROLATE 0.2 MG/ML
INJECTION INTRAMUSCULAR; INTRAVENOUS
Status: DISPENSED
Start: 2020-12-09

## (undated) RX ORDER — VASOPRESSIN 20 U/ML
INJECTION PARENTERAL
Status: DISPENSED
Start: 2020-12-09

## (undated) RX ORDER — CELECOXIB 200 MG/1
CAPSULE ORAL
Status: DISPENSED
Start: 2020-12-09

## (undated) RX ORDER — KETAMINE HYDROCHLORIDE 10 MG/ML
INJECTION, SOLUTION INTRAMUSCULAR; INTRAVENOUS
Status: DISPENSED
Start: 2020-12-09

## (undated) RX ORDER — CEFAZOLIN SODIUM 2 G/100ML
INJECTION, SOLUTION INTRAVENOUS
Status: DISPENSED
Start: 2020-12-09